# Patient Record
Sex: MALE | Race: ASIAN | NOT HISPANIC OR LATINO | URBAN - METROPOLITAN AREA
[De-identification: names, ages, dates, MRNs, and addresses within clinical notes are randomized per-mention and may not be internally consistent; named-entity substitution may affect disease eponyms.]

---

## 2020-05-28 ENCOUNTER — INPATIENT (INPATIENT)
Facility: HOSPITAL | Age: 30
LOS: 0 days | Discharge: HOME | End: 2020-05-29
Attending: INTERNAL MEDICINE | Admitting: INTERNAL MEDICINE
Payer: COMMERCIAL

## 2020-05-28 VITALS
SYSTOLIC BLOOD PRESSURE: 151 MMHG | DIASTOLIC BLOOD PRESSURE: 91 MMHG | OXYGEN SATURATION: 100 % | HEART RATE: 122 BPM | RESPIRATION RATE: 16 BRPM | TEMPERATURE: 98 F

## 2020-05-28 DIAGNOSIS — R00.2 PALPITATIONS: ICD-10-CM

## 2020-05-28 DIAGNOSIS — Z29.9 ENCOUNTER FOR PROPHYLACTIC MEASURES, UNSPECIFIED: ICD-10-CM

## 2020-05-28 LAB
ALBUMIN SERPL ELPH-MCNC: 4.8 G/DL — SIGNIFICANT CHANGE UP (ref 3.3–5)
ALP SERPL-CCNC: 64 U/L — SIGNIFICANT CHANGE UP (ref 40–120)
ALT FLD-CCNC: 22 U/L — SIGNIFICANT CHANGE UP (ref 4–41)
ANION GAP SERPL CALC-SCNC: 15 MMO/L — HIGH (ref 7–14)
APTT BLD: 30.6 SEC — SIGNIFICANT CHANGE UP (ref 27.5–36.3)
AST SERPL-CCNC: 26 U/L — SIGNIFICANT CHANGE UP (ref 4–40)
BASOPHILS # BLD AUTO: 0.03 K/UL — SIGNIFICANT CHANGE UP (ref 0–0.2)
BASOPHILS NFR BLD AUTO: 0.4 % — SIGNIFICANT CHANGE UP (ref 0–2)
BILIRUB SERPL-MCNC: 0.4 MG/DL — SIGNIFICANT CHANGE UP (ref 0.2–1.2)
BUN SERPL-MCNC: 15 MG/DL — SIGNIFICANT CHANGE UP (ref 7–23)
CALCIUM SERPL-MCNC: 9 MG/DL — SIGNIFICANT CHANGE UP (ref 8.4–10.5)
CHLORIDE SERPL-SCNC: 102 MMOL/L — SIGNIFICANT CHANGE UP (ref 98–107)
CO2 SERPL-SCNC: 20 MMOL/L — LOW (ref 22–31)
CREAT SERPL-MCNC: 1.04 MG/DL — SIGNIFICANT CHANGE UP (ref 0.5–1.3)
EOSINOPHIL # BLD AUTO: 0.13 K/UL — SIGNIFICANT CHANGE UP (ref 0–0.5)
EOSINOPHIL NFR BLD AUTO: 1.6 % — SIGNIFICANT CHANGE UP (ref 0–6)
GLUCOSE SERPL-MCNC: 112 MG/DL — HIGH (ref 70–99)
HCT VFR BLD CALC: 43.2 % — SIGNIFICANT CHANGE UP (ref 39–50)
HGB BLD-MCNC: 14.2 G/DL — SIGNIFICANT CHANGE UP (ref 13–17)
IMM GRANULOCYTES NFR BLD AUTO: 0.4 % — SIGNIFICANT CHANGE UP (ref 0–1.5)
INR BLD: 1.04 — SIGNIFICANT CHANGE UP (ref 0.88–1.17)
LYMPHOCYTES # BLD AUTO: 1.3 K/UL — SIGNIFICANT CHANGE UP (ref 1–3.3)
LYMPHOCYTES # BLD AUTO: 15.7 % — SIGNIFICANT CHANGE UP (ref 13–44)
MCHC RBC-ENTMCNC: 30.3 PG — SIGNIFICANT CHANGE UP (ref 27–34)
MCHC RBC-ENTMCNC: 32.9 % — SIGNIFICANT CHANGE UP (ref 32–36)
MCV RBC AUTO: 92.1 FL — SIGNIFICANT CHANGE UP (ref 80–100)
MONOCYTES # BLD AUTO: 0.62 K/UL — SIGNIFICANT CHANGE UP (ref 0–0.9)
MONOCYTES NFR BLD AUTO: 7.5 % — SIGNIFICANT CHANGE UP (ref 2–14)
NEUTROPHILS # BLD AUTO: 6.17 K/UL — SIGNIFICANT CHANGE UP (ref 1.8–7.4)
NEUTROPHILS NFR BLD AUTO: 74.4 % — SIGNIFICANT CHANGE UP (ref 43–77)
NRBC # FLD: 0 K/UL — SIGNIFICANT CHANGE UP (ref 0–0)
PLATELET # BLD AUTO: 228 K/UL — SIGNIFICANT CHANGE UP (ref 150–400)
PMV BLD: 9.5 FL — SIGNIFICANT CHANGE UP (ref 7–13)
POTASSIUM SERPL-MCNC: 4.1 MMOL/L — SIGNIFICANT CHANGE UP (ref 3.5–5.3)
POTASSIUM SERPL-SCNC: 4.1 MMOL/L — SIGNIFICANT CHANGE UP (ref 3.5–5.3)
PROT SERPL-MCNC: 7.4 G/DL — SIGNIFICANT CHANGE UP (ref 6–8.3)
PROTHROM AB SERPL-ACNC: 12 SEC — SIGNIFICANT CHANGE UP (ref 9.8–13.1)
RBC # BLD: 4.69 M/UL — SIGNIFICANT CHANGE UP (ref 4.2–5.8)
RBC # FLD: 12 % — SIGNIFICANT CHANGE UP (ref 10.3–14.5)
SODIUM SERPL-SCNC: 137 MMOL/L — SIGNIFICANT CHANGE UP (ref 135–145)
TROPONIN T, HIGH SENSITIVITY: < 6 NG/L — SIGNIFICANT CHANGE UP (ref ?–14)
TSH SERPL-MCNC: 1.6 UIU/ML — SIGNIFICANT CHANGE UP (ref 0.27–4.2)
WBC # BLD: 8.28 K/UL — SIGNIFICANT CHANGE UP (ref 3.8–10.5)
WBC # FLD AUTO: 8.28 K/UL — SIGNIFICANT CHANGE UP (ref 3.8–10.5)

## 2020-05-28 PROCEDURE — 71045 X-RAY EXAM CHEST 1 VIEW: CPT | Mod: 26

## 2020-05-28 RX ORDER — ENOXAPARIN SODIUM 100 MG/ML
40 INJECTION SUBCUTANEOUS EVERY 24 HOURS
Refills: 0 | Status: DISCONTINUED | OUTPATIENT
Start: 2020-05-28 | End: 2020-05-29

## 2020-05-28 RX ORDER — SODIUM CHLORIDE 9 MG/ML
3 INJECTION INTRAMUSCULAR; INTRAVENOUS; SUBCUTANEOUS EVERY 8 HOURS
Refills: 0 | Status: DISCONTINUED | OUTPATIENT
Start: 2020-05-28 | End: 2020-05-29

## 2020-05-28 RX ORDER — ASPIRIN/CALCIUM CARB/MAGNESIUM 324 MG
81 TABLET ORAL DAILY
Refills: 0 | Status: DISCONTINUED | OUTPATIENT
Start: 2020-05-28 | End: 2020-05-29

## 2020-05-28 RX ORDER — SODIUM CHLORIDE 9 MG/ML
1000 INJECTION INTRAMUSCULAR; INTRAVENOUS; SUBCUTANEOUS ONCE
Refills: 0 | Status: COMPLETED | OUTPATIENT
Start: 2020-05-28 | End: 2020-05-28

## 2020-05-28 RX ADMIN — SODIUM CHLORIDE 1000 MILLILITER(S): 9 INJECTION INTRAMUSCULAR; INTRAVENOUS; SUBCUTANEOUS at 18:50

## 2020-05-28 RX ADMIN — SODIUM CHLORIDE 1000 MILLILITER(S): 9 INJECTION INTRAMUSCULAR; INTRAVENOUS; SUBCUTANEOUS at 17:33

## 2020-05-28 NOTE — H&P ADULT - MUSCULOSKELETAL
detailed exam no joint swelling/no joint erythema/no joint warmth/no calf tenderness/normal strength

## 2020-05-28 NOTE — ED ADULT NURSE NOTE - OBJECTIVE STATEMENT
28 y/o male presents to ED per EMS for SVT. Pt endorses feeling a fast heart beat when he became to feel dizzy, sob, and his arms and legs started to cramp up, states it happened while he was driving. Pt denies chest pain during episode and now. Denies sob currently. As per EMS, HR increased to ~165 and came down on its own. Pt a&ox4, ambulatory, placed on cardiac monitor. ST noted. Respirations even and unlabored. Vitals as per flowsheets. Pt has seen a cardiologist in the past for previous episode. MD at bedside for pt evaluation. Will continue to monitor. 28 y/o male presents to ED per EMS for SVT. Pt endorses feeling a fast heart beat when he became to feel dizzy, sob, and his arms and legs started to cramp up, states it happened while he was driving. Pt denies chest pain during episode and now. Denies sob currently. As per EMS, HR increased to ~165 and heart rate decreased without intervention. Pt a&ox4, ambulatory, placed on cardiac monitor. ST noted. Respirations even and unlabored. Vitals as per flowsheets. Pt has seen a cardiologist in the past for previous episode. MD at bedside for pt evaluation. Will continue to monitor.    18g IV noted to L forearm per EMS  20G IV placed to R AC. Labs obtained as per orders.

## 2020-05-28 NOTE — H&P ADULT - HISTORY OF PRESENT ILLNESS
29M with no past medical history, not on any medications experiencing palpitations for the past 6 months, was seen by a cardiologist and was told he had structural heart problems. Patient experienced nonexertional, palpitations, while driving, assoc with lightheadedness, darkening vision and near syncope. Made it to his office where his manager called 911. EMS noted the pt was in SVT @160 and self converted.  Currently the pt is symptom free. Denies SOB, cough, chills, diaphoresis, chest pain, nausea, vomit, diarrhea, constipation, abdominal pain, body aches, dysuria.  The pt says he consumed a medium cup of coffee and 1 small coke bottle prior to palpitations.     In the ED  EKG ST @ 118b/ min  TROP< 6  TSH = 1.6  CXR = Clear    Vitals: T max= 98 oral, HR= 88b/ min, BP = 149/ 82, RR= 17b/ min, SPO2= 100% ra

## 2020-05-28 NOTE — ED PROVIDER NOTE - OBJECTIVE STATEMENT
Klaus RODAS MD PGY2: 29 M hx palpitations 6 months ago that was seen by a cardiologist and deemed to have no structural heart problems, here for recurrent palpitations x 1 hour. Patient had been driving and felt palpitations assoc with lightheadedness, darkening vision and near syncope. Made it to his office where his manager called 911. En route, EMS noted that he was in what they thought to be SVT in the 160 that self "broke" without meds. Symptoms resolved now. No cocaine use No alcohol use Drank a medium [employer] coffee and 1 small coke bottle today.

## 2020-05-28 NOTE — ED PROVIDER NOTE - ATTENDING CONTRIBUTION TO CARE
Seen and examined, states 2nd episode of palpitations, weakness, lightheadedness, 1st episode in Nov., had ED visit and dc'd for Cardiology outpt. FU. In office pt. had normal exam and no further sx, had treadmill stress test "normal." Since then no sx until today, had inc. exertion yest with packing up office for move, today moved into new office with much lifting/carrying and c/o fatigue, then onset of palp., lightheadedness, SOB, near syncope, similar to prev. event but worse. Lasted ~1 hr. and with EMS had HR 160s, "? SVT" and before receiving adenosine HR dec. to 120s. In ED NSR and tachycardic feeling better, review of strip shows likely p waves, poss. not SVT. Pt. states resting HR in MD offices often ~100. No recent illness, no fever, no chills, no meds today, no caffeine, no sedatives or alcohol yesterday. MMM, clear lungs, heart reg, sl. tachy, no murmur, soft abd, NT to palp, no edema, NT calves, good pulses.

## 2020-05-28 NOTE — H&P ADULT - NEGATIVE NEUROLOGICAL SYMPTOMS
no loss of consciousness/no transient paralysis/no focal seizures/no tremors/no hemiparesis/no facial palsy/no paresthesias/no difficulty walking/no confusion/no syncope/no headache/no weakness/no generalized seizures/no loss of sensation

## 2020-05-28 NOTE — H&P ADULT - NSHPLABSRESULTS_GEN_ALL_CORE
EKG ST @ 118b/ min  TROP< 6  TSH = 1.6  CXR = Clear                          14.2   8.28  )-----------( 228      ( 28 May 2020 17:00 )             43.2   05-28    137  |  102  |  15  ----------------------------<  112<H>  4.1   |  20<L>  |  1.04    Ca    9.0      28 May 2020 17:00    TPro  7.4  /  Alb  4.8  /  TBili  0.4  /  DBili  x   /  AST  26  /  ALT  22  /  AlkPhos  64  05-28

## 2020-05-28 NOTE — H&P ADULT - NEUROLOGICAL DETAILS
alert and oriented x 3/sensation intact/normal strength/responds to verbal commands/no spontaneous movement

## 2020-05-28 NOTE — H&P ADULT - NEGATIVE OPHTHALMOLOGIC SYMPTOMS
no photophobia/no blurred vision R/no discharge R/no lacrimation L/no lacrimation R/no blurred vision L/no discharge L

## 2020-05-28 NOTE — ED ADULT NURSE REASSESSMENT NOTE - NS ED NURSE REASSESS COMMENT FT1
Break coverage note:  Pt currently w/o complaints.  IV fluids completed, IV saline locked at this time.  Continue to monitor.
assumed care of pt, pt resting on stretcher denies CP/SOB, denies dizziness, denies heart palpitations. Ox3, respirations even and non labored, skin warm and dry. awaiting bed assignment. NAD.

## 2020-05-28 NOTE — H&P ADULT - NEGATIVE ENMT SYMPTOMS
no tinnitus/no sinus symptoms/no nasal discharge/no ear pain/no nasal congestion/no nasal obstruction

## 2020-05-28 NOTE — H&P ADULT - ATTENDING COMMENTS
29/M admitted with palpitations, found to be in SVT per EMS (available strips shows sinus tachycardia), in NSR in ER with resolution of symptoms/ Continue tele monitoring, get ED Echo, keep K+>4, Mg>2.

## 2020-05-28 NOTE — ED ADULT NURSE REASSESSMENT NOTE - STATUS
pt will be admitted for palpitations, has no complaints at this time, cardiac monitor scoping SR @ 88 bpm./awaiting bed, no change

## 2020-05-28 NOTE — ED PROVIDER NOTE - PHYSICAL EXAMINATION
Klaus RODAS MD PGY2:   PHYSICAL EXAM:    GENERAL: NAD, well-developed  HEENT:  Atraumatic, Normocephalic  CHEST/LUNG: Chest rise equal bilaterally. CTAB.   HEART: Regular rate and rhythm. No murmurs or rubs. Tachycardic.   ABDOMEN: Soft, Nontender, Nondistended.   EXTREMITIES:  2+ Peripheral Pulses.  PSYCH: A&Ox3  SKIN: No obvious rashes or lesions

## 2020-05-28 NOTE — ED PROVIDER NOTE - CLINICAL SUMMARY MEDICAL DECISION MAKING FREE TEXT BOX
Klaus RODAS MD PGY2: 29 here with symptomatic tachyarythmia that self resolved that is now in sinus tach without lower extremity swelling and no reason to have PE. Will obtain CBC, CMP, trop and coags. Likely admit to tele doc for monitoring.

## 2020-05-28 NOTE — H&P ADULT - RS GEN PE MLT RESP DETAILS PC
respirations non-labored/no chest wall tenderness/no subcutaneous emphysema/good air movement/no intercostal retractions/airway patent/breath sounds equal/clear to auscultation bilaterally/no rales/no rhonchi/no wheezes

## 2020-05-28 NOTE — H&P ADULT - NSHPSOCIALHISTORY_GEN_ALL_CORE
.  Lives with the spouse.  Denies Nicotine.  Denies ETOH  Denies illicit/ recreational drug use.  Work in [employer].

## 2020-05-29 ENCOUNTER — TRANSCRIPTION ENCOUNTER (OUTPATIENT)
Age: 30
End: 2020-05-29

## 2020-05-29 VITALS
DIASTOLIC BLOOD PRESSURE: 96 MMHG | RESPIRATION RATE: 18 BRPM | OXYGEN SATURATION: 100 % | SYSTOLIC BLOOD PRESSURE: 148 MMHG | TEMPERATURE: 98 F | HEART RATE: 110 BPM

## 2020-05-29 LAB
CHOLEST SERPL-MCNC: 199 MG/DL — SIGNIFICANT CHANGE UP (ref 120–199)
CK MB BLD-MCNC: 0.6 — SIGNIFICANT CHANGE UP (ref 0–2.5)
CK MB BLD-MCNC: < 1 NG/ML — LOW (ref 1–6.6)
CK SERPL-CCNC: 170 U/L — SIGNIFICANT CHANGE UP (ref 30–200)
HBA1C BLD-MCNC: 5.2 % — SIGNIFICANT CHANGE UP (ref 4–5.6)
HCT VFR BLD CALC: 41.5 % — SIGNIFICANT CHANGE UP (ref 39–50)
HDLC SERPL-MCNC: 46 MG/DL — SIGNIFICANT CHANGE UP (ref 35–55)
HGB BLD-MCNC: 14 G/DL — SIGNIFICANT CHANGE UP (ref 13–17)
LIPID PNL WITH DIRECT LDL SERPL: 154 MG/DL — SIGNIFICANT CHANGE UP
MCHC RBC-ENTMCNC: 31 PG — SIGNIFICANT CHANGE UP (ref 27–34)
MCHC RBC-ENTMCNC: 33.7 % — SIGNIFICANT CHANGE UP (ref 32–36)
MCV RBC AUTO: 92 FL — SIGNIFICANT CHANGE UP (ref 80–100)
NRBC # FLD: 0 K/UL — SIGNIFICANT CHANGE UP (ref 0–0)
PLATELET # BLD AUTO: 240 K/UL — SIGNIFICANT CHANGE UP (ref 150–400)
PMV BLD: 9.6 FL — SIGNIFICANT CHANGE UP (ref 7–13)
RBC # BLD: 4.51 M/UL — SIGNIFICANT CHANGE UP (ref 4.2–5.8)
RBC # FLD: 12.1 % — SIGNIFICANT CHANGE UP (ref 10.3–14.5)
SARS-COV-2 RNA SPEC QL NAA+PROBE: SIGNIFICANT CHANGE UP
TRIGL SERPL-MCNC: 130 MG/DL — SIGNIFICANT CHANGE UP (ref 10–149)
TROPONIN T, HIGH SENSITIVITY: 7 NG/L — SIGNIFICANT CHANGE UP (ref ?–14)
TSH SERPL-MCNC: 2.21 UIU/ML — SIGNIFICANT CHANGE UP (ref 0.27–4.2)
WBC # BLD: 8 K/UL — SIGNIFICANT CHANGE UP (ref 3.8–10.5)
WBC # FLD AUTO: 8 K/UL — SIGNIFICANT CHANGE UP (ref 3.8–10.5)

## 2020-05-29 PROCEDURE — 93306 TTE W/DOPPLER COMPLETE: CPT | Mod: 26

## 2020-05-29 RX ADMIN — Medication 81 MILLIGRAM(S): at 11:24

## 2020-05-29 RX ADMIN — Medication 1 TABLET(S): at 11:24

## 2020-05-29 RX ADMIN — SODIUM CHLORIDE 3 MILLILITER(S): 9 INJECTION INTRAMUSCULAR; INTRAVENOUS; SUBCUTANEOUS at 05:59

## 2020-05-29 RX ADMIN — ENOXAPARIN SODIUM 40 MILLIGRAM(S): 100 INJECTION SUBCUTANEOUS at 02:27

## 2020-05-29 RX ADMIN — SODIUM CHLORIDE 3 MILLILITER(S): 9 INJECTION INTRAMUSCULAR; INTRAVENOUS; SUBCUTANEOUS at 11:25

## 2020-05-29 NOTE — DISCHARGE NOTE PROVIDER - CARE PROVIDER_API CALL
Martinez Miles  CARDIOVASCULAR DISEASE  [address] [place] [address]  Phone: [phone]  Fax: [phone]  Follow Up Time: Dr. Miles,   [address] [facility] [address]  [facility] [id number] [phone]  Phone: (   )    -  Fax: (   )    -  Scheduled Appointment: 06/01/2020 05:30 PM

## 2020-05-29 NOTE — DISCHARGE NOTE NURSING/CASE MANAGEMENT/SOCIAL WORK - PATIENT PORTAL LINK FT
You can access the FollowMyHealth Patient Portal offered by [facility] by registering at the following website: [email or web address]. By joining [facility]’s FollowMyHealth portal, you will also be able to view your health information using other applications (apps) compatible with our system.

## 2020-05-29 NOTE — DISCHARGE NOTE PROVIDER - HOSPITAL COURSE
29M admitted for palpitaitons, found to be in SVT @ 160 by EMS and self coverted to NSR    Echo -        dispo: home 29M admitted for palpitaitons, found to be in SVT @ 160 by EMS and self coverted to NSR    Echo -        dispo: home - follow up with Dr. Miles

## 2020-05-29 NOTE — DISCHARGE NOTE PROVIDER - PROVIDER TOKENS
PROVIDER:[TOKEN:[[id number]]] FREE:[LAST:[Dr. Miles],PHONE:[(   )    -],FAX:[(   )    -],ADDRESS:[[facility]-[address] [place] [address] [address] [place] [id number] [phone]],SCHEDULEDAPPT:[06/01/2020],SCHEDULEDAPPTTIME:[05:30 PM]]

## 2020-05-29 NOTE — DISCHARGE NOTE PROVIDER - NSDCCPCAREPLAN_GEN_ALL_CORE_FT
PRINCIPAL DISCHARGE DIAGNOSIS  Diagnosis: Palpitations  Assessment and Plan of Treatment: You were evaluated by cardiology   You underwent an echocardiogram during your stay.  No new medications at this time  Follow up with Dr. Miles in 2 weeks for further care. PRINCIPAL DISCHARGE DIAGNOSIS  Diagnosis: Palpitations  Assessment and Plan of Treatment: You were evaluated by cardiology   You underwent an echocardiogram during your stay.  No new medications at this time  Follow up with Dr. Miles  JUNE 6th 5:30 PM at  [address] [facility] [address]  [facility] [address] [phone]

## 2020-07-07 ENCOUNTER — TRANSCRIPTION ENCOUNTER (OUTPATIENT)
Age: 30
End: 2020-07-07

## 2020-08-13 ENCOUNTER — TRANSCRIPTION ENCOUNTER (OUTPATIENT)
Age: 30
End: 2020-08-13

## 2020-09-30 NOTE — ED ADULT TRIAGE NOTE - SPO2 (%)
2:10 PM  Assumed care of pt. Dr Marika Willams spoke with pt  to update via phone -- pt mother will provide transport but unable to arrive until ~ 4PM    4:30 PM  Discharge papers provided; Reviewed DC instructions with patient. Opportunity for questions and clarifications was provided; verbalized understanding. Follow-up appointments reviewed/written for patient. Pt stable and ambulatory for discharge; mother to provide transportation to home. Clarified all personal belongings sent with patient - security brought pt items to her prior to d/c. Wallet/ID/credit card/jewlery. IV removed (without difficulty/pt tolerated well) Telemetry discontinued. 100

## 2022-11-30 PROBLEM — Z78.9 OTHER SPECIFIED HEALTH STATUS: Chronic | Status: ACTIVE | Noted: 2020-05-28

## 2023-01-08 PROBLEM — Z00.00 ENCOUNTER FOR PREVENTIVE HEALTH EXAMINATION: Status: ACTIVE | Noted: 2023-01-08

## 2023-01-09 ENCOUNTER — APPOINTMENT (OUTPATIENT)
Dept: UROLOGY | Facility: CLINIC | Age: 33
End: 2023-01-09
Payer: COMMERCIAL

## 2023-01-09 VITALS
SYSTOLIC BLOOD PRESSURE: 147 MMHG | DIASTOLIC BLOOD PRESSURE: 86 MMHG | HEIGHT: 74 IN | HEART RATE: 76 BPM | WEIGHT: 185 LBS | BODY MASS INDEX: 23.74 KG/M2

## 2023-01-09 DIAGNOSIS — R82.90 UNSPECIFIED ABNORMAL FINDINGS IN URINE: ICD-10-CM

## 2023-01-09 DIAGNOSIS — Z78.9 OTHER SPECIFIED HEALTH STATUS: ICD-10-CM

## 2023-01-09 DIAGNOSIS — F43.0 PANIC DISORDER [EPISODIC PAROXYSMAL ANXIETY]: ICD-10-CM

## 2023-01-09 DIAGNOSIS — F41.0 PANIC DISORDER [EPISODIC PAROXYSMAL ANXIETY]: ICD-10-CM

## 2023-01-09 DIAGNOSIS — R10.31 RIGHT LOWER QUADRANT PAIN: ICD-10-CM

## 2023-01-09 DIAGNOSIS — G89.29 RIGHT LOWER QUADRANT PAIN: ICD-10-CM

## 2023-01-09 LAB
APPEARANCE: CLEAR
BACTERIA: NEGATIVE
BILIRUBIN URINE: NEGATIVE
BLOOD URINE: NEGATIVE
COLOR: NORMAL
GLUCOSE QUALITATIVE U: NEGATIVE
HYALINE CASTS: 0 /LPF
KETONES URINE: NEGATIVE
LEUKOCYTE ESTERASE URINE: NEGATIVE
MICROSCOPIC-UA: NORMAL
NITRITE URINE: NEGATIVE
PH URINE: 6.5
PROTEIN URINE: NEGATIVE
RED BLOOD CELLS URINE: 1 /HPF
SPECIFIC GRAVITY URINE: 1.01
SQUAMOUS EPITHELIAL CELLS: 0 /HPF
UROBILINOGEN URINE: NORMAL
WHITE BLOOD CELLS URINE: 0 /HPF

## 2023-01-09 PROCEDURE — 88112 CYTOPATH CELL ENHANCE TECH: CPT | Mod: 26

## 2023-01-09 PROCEDURE — 99203 OFFICE O/P NEW LOW 30 MIN: CPT

## 2023-01-09 RX ORDER — METOPROLOL TARTRATE 25 MG/1
25 TABLET, FILM COATED ORAL
Refills: 0 | Status: ACTIVE | COMMUNITY

## 2023-01-09 NOTE — ADDENDUM
[FreeTextEntry1] : Entered by Abraham Saclido, acting as scribe for Dr. Julio Frost.\par The documentation recorded by the scribe accurately reflects the service I personally performed and the decisions made by me.

## 2023-01-09 NOTE — LETTER BODY
[FreeTextEntry1] : Richar Silva MD\par [address]\[place] [address]\par [phone]\par \par Dear Dr. Silva, \par \par Reason for visit: Abnormal urinalysis, possible microscopic hematuria. Chronic right lower quadrant pain. \par \par This is a 32 year-old male who was found to have abnormal urinalysis. Urinalysis report demonstrated evidence of hematuria on urinary dipstick. However on microscopy there were no red cells seen. Patient underwent a negative CT scan. He reports chronic right lower quadrant pain. The patient denies any aggravating or relieving factors. The patient denies any interference of function. The patient is entirely asymptomatic. All other review of systems are negative. He has no cancer in his family medical history. He has no previous surgical history. Past medical history, family history and social history were inquired and were noncontributory to current condition. The patient does not use tobacco or drink alcohol. Medications and allergies were reviewed. He has no known allergies to medication. \par \par On examination, the patient is a healthy-appearing gentleman in no acute distress. He is alert and oriented follows commands. He has normal mood and affect. He is normocephalic. Neck is supple. Respirations are unlabored. His abdomen is soft and nontender. Bladder is nonpalpable. No CVA tenderness. Neurologically he is grossly intact. No peripheral edema. Skin without gross abnormality. He has normal male external genitalia. Normal meatus. Bilateral testes are descended intrascrotally and normal to palpation. On rectal examination, there is normal sphincter tone. The prostate is clinically benign without focal induration or nodularity.\par \par Assessment: Abnormal urinalysis, possible microscopic hematuria. Chronic right lower quadrant pain.\par \par I counseled the patient on the various etiologies of the microscopic hematuria. I discussed the risk of occult malignancy. Given the absence of red blood cells on microscopy, the urine dipstick may represent a false positive result. I recommended the patient repeat the urinalysis and obtain a urine cytology and urine culture prior to proceed with hematuria evaluation. In terms of his lower quadrant pain, the etiology is unclear. I encouraged he maintain conservative management.  I answered the patient's questions. The risks and expected outcomes were discussed. The patient will follow up as directed and contact me with any questions or concerns.\par \par Plan: Repeat urinalysis, and obtain urine cytology. Urine culture. Conservative management of lower quadrant pain. Follow up in 6 months.

## 2023-01-10 LAB
BACTERIA UR CULT: NORMAL
URINE CYTOLOGY: NORMAL
